# Patient Record
Sex: MALE | Race: OTHER | HISPANIC OR LATINO | URBAN - METROPOLITAN AREA
[De-identification: names, ages, dates, MRNs, and addresses within clinical notes are randomized per-mention and may not be internally consistent; named-entity substitution may affect disease eponyms.]

---

## 2023-01-16 ENCOUNTER — INPATIENT (INPATIENT)
Facility: HOSPITAL | Age: 78
LOS: 0 days | Discharge: HOME | End: 2023-01-16
Attending: INTERNAL MEDICINE | Admitting: INTERNAL MEDICINE
Payer: MEDICARE

## 2023-01-16 VITALS
OXYGEN SATURATION: 109 % | RESPIRATION RATE: 16 BRPM | TEMPERATURE: 98 F | SYSTOLIC BLOOD PRESSURE: 143 MMHG | HEART RATE: 109 BPM | DIASTOLIC BLOOD PRESSURE: 94 MMHG

## 2023-01-16 VITALS
DIASTOLIC BLOOD PRESSURE: 69 MMHG | RESPIRATION RATE: 18 BRPM | SYSTOLIC BLOOD PRESSURE: 130 MMHG | TEMPERATURE: 98 F | OXYGEN SATURATION: 99 % | HEART RATE: 81 BPM

## 2023-01-16 DIAGNOSIS — Z29.9 ENCOUNTER FOR PROPHYLACTIC MEASURES, UNSPECIFIED: ICD-10-CM

## 2023-01-16 DIAGNOSIS — E11.65 TYPE 2 DIABETES MELLITUS WITH HYPERGLYCEMIA: ICD-10-CM

## 2023-01-16 DIAGNOSIS — I10 ESSENTIAL (PRIMARY) HYPERTENSION: ICD-10-CM

## 2023-01-16 DIAGNOSIS — E11.9 TYPE 2 DIABETES MELLITUS WITHOUT COMPLICATIONS: ICD-10-CM

## 2023-01-16 LAB
A1C WITH ESTIMATED AVERAGE GLUCOSE RESULT: 12.7 % — HIGH (ref 4–5.6)
A1C WITH ESTIMATED AVERAGE GLUCOSE RESULT: 13 % — HIGH (ref 4–5.6)
ALBUMIN SERPL ELPH-MCNC: 4.3 G/DL — SIGNIFICANT CHANGE UP (ref 3.3–5)
ALP SERPL-CCNC: 94 U/L — SIGNIFICANT CHANGE UP (ref 40–120)
ALT FLD-CCNC: 22 U/L — SIGNIFICANT CHANGE UP (ref 4–41)
AMPHET UR-MCNC: NEGATIVE — SIGNIFICANT CHANGE UP
ANION GAP SERPL CALC-SCNC: 10 MMOL/L — SIGNIFICANT CHANGE UP (ref 7–14)
APAP SERPL-MCNC: <10 UG/ML — LOW (ref 15–25)
APPEARANCE UR: CLEAR — SIGNIFICANT CHANGE UP
APTT BLD: 24.7 SEC — LOW (ref 27–36.3)
AST SERPL-CCNC: 20 U/L — SIGNIFICANT CHANGE UP (ref 4–40)
B PERT DNA SPEC QL NAA+PROBE: SIGNIFICANT CHANGE UP
B PERT+PARAPERT DNA PNL SPEC NAA+PROBE: SIGNIFICANT CHANGE UP
B-OH-BUTYR SERPL-SCNC: 0.8 MMOL/L — HIGH (ref 0–0.4)
BARBITURATES UR SCN-MCNC: NEGATIVE — SIGNIFICANT CHANGE UP
BASE EXCESS BLDV CALC-SCNC: 2.3 MMOL/L — SIGNIFICANT CHANGE UP (ref -2–3)
BASOPHILS # BLD AUTO: 0.05 K/UL — SIGNIFICANT CHANGE UP (ref 0–0.2)
BASOPHILS NFR BLD AUTO: 0.4 % — SIGNIFICANT CHANGE UP (ref 0–2)
BENZODIAZ UR-MCNC: NEGATIVE — SIGNIFICANT CHANGE UP
BILIRUB SERPL-MCNC: 0.7 MG/DL — SIGNIFICANT CHANGE UP (ref 0.2–1.2)
BILIRUB UR-MCNC: NEGATIVE — SIGNIFICANT CHANGE UP
BLOOD GAS VENOUS COMPREHENSIVE RESULT: SIGNIFICANT CHANGE UP
BORDETELLA PARAPERTUSSIS (RAPRVP): SIGNIFICANT CHANGE UP
BUN SERPL-MCNC: 24 MG/DL — HIGH (ref 7–23)
C PNEUM DNA SPEC QL NAA+PROBE: SIGNIFICANT CHANGE UP
CALCIUM SERPL-MCNC: 9.2 MG/DL — SIGNIFICANT CHANGE UP (ref 8.4–10.5)
CHLORIDE BLDV-SCNC: 95 MMOL/L — LOW (ref 96–108)
CHLORIDE SERPL-SCNC: 93 MMOL/L — LOW (ref 98–107)
CK SERPL-CCNC: 215 U/L — HIGH (ref 30–200)
CO2 BLDV-SCNC: 30.4 MMOL/L — HIGH (ref 22–26)
CO2 SERPL-SCNC: 28 MMOL/L — SIGNIFICANT CHANGE UP (ref 22–31)
COCAINE METAB.OTHER UR-MCNC: NEGATIVE — SIGNIFICANT CHANGE UP
COLOR SPEC: SIGNIFICANT CHANGE UP
CREAT SERPL-MCNC: 0.65 MG/DL — SIGNIFICANT CHANGE UP (ref 0.5–1.3)
CREATININE URINE RESULT, DAU: 63 MG/DL — SIGNIFICANT CHANGE UP
DIFF PNL FLD: NEGATIVE — SIGNIFICANT CHANGE UP
EGFR: 98 ML/MIN/1.73M2 — SIGNIFICANT CHANGE UP
EOSINOPHIL # BLD AUTO: 0 K/UL — SIGNIFICANT CHANGE UP (ref 0–0.5)
EOSINOPHIL NFR BLD AUTO: 0 % — SIGNIFICANT CHANGE UP (ref 0–6)
ESTIMATED AVERAGE GLUCOSE: 318 — SIGNIFICANT CHANGE UP
ESTIMATED AVERAGE GLUCOSE: 326 — SIGNIFICANT CHANGE UP
ETHANOL SERPL-MCNC: <10 MG/DL — SIGNIFICANT CHANGE UP
FLUAV SUBTYP SPEC NAA+PROBE: SIGNIFICANT CHANGE UP
FLUBV RNA SPEC QL NAA+PROBE: SIGNIFICANT CHANGE UP
GAS PNL BLDV: 129 MMOL/L — LOW (ref 136–145)
GLUCOSE BLDC GLUCOMTR-MCNC: 248 MG/DL — HIGH (ref 70–99)
GLUCOSE BLDC GLUCOMTR-MCNC: 250 MG/DL — HIGH (ref 70–99)
GLUCOSE BLDC GLUCOMTR-MCNC: 327 MG/DL — HIGH (ref 70–99)
GLUCOSE BLDV-MCNC: 445 MG/DL — HIGH (ref 70–99)
GLUCOSE SERPL-MCNC: 438 MG/DL — HIGH (ref 70–99)
GLUCOSE UR QL: ABNORMAL
HADV DNA SPEC QL NAA+PROBE: SIGNIFICANT CHANGE UP
HCO3 BLDV-SCNC: 29 MMOL/L — SIGNIFICANT CHANGE UP (ref 22–29)
HCOV 229E RNA SPEC QL NAA+PROBE: SIGNIFICANT CHANGE UP
HCOV HKU1 RNA SPEC QL NAA+PROBE: SIGNIFICANT CHANGE UP
HCOV NL63 RNA SPEC QL NAA+PROBE: SIGNIFICANT CHANGE UP
HCOV OC43 RNA SPEC QL NAA+PROBE: SIGNIFICANT CHANGE UP
HCT VFR BLD CALC: 40.6 % — SIGNIFICANT CHANGE UP (ref 39–50)
HCT VFR BLDA CALC: 43 % — SIGNIFICANT CHANGE UP (ref 39–51)
HGB BLD CALC-MCNC: 14.3 G/DL — SIGNIFICANT CHANGE UP (ref 13–17)
HGB BLD-MCNC: 14.2 G/DL — SIGNIFICANT CHANGE UP (ref 13–17)
HMPV RNA SPEC QL NAA+PROBE: SIGNIFICANT CHANGE UP
HPIV1 RNA SPEC QL NAA+PROBE: SIGNIFICANT CHANGE UP
HPIV2 RNA SPEC QL NAA+PROBE: SIGNIFICANT CHANGE UP
HPIV3 RNA SPEC QL NAA+PROBE: SIGNIFICANT CHANGE UP
HPIV4 RNA SPEC QL NAA+PROBE: SIGNIFICANT CHANGE UP
IANC: 9.71 K/UL — HIGH (ref 1.8–7.4)
IMM GRANULOCYTES NFR BLD AUTO: 0.4 % — SIGNIFICANT CHANGE UP (ref 0–0.9)
INR BLD: 0.99 RATIO — SIGNIFICANT CHANGE UP (ref 0.88–1.16)
KETONES UR-MCNC: ABNORMAL
LACTATE BLDV-MCNC: 1.7 MMOL/L — SIGNIFICANT CHANGE UP (ref 0.5–2)
LEUKOCYTE ESTERASE UR-ACNC: NEGATIVE — SIGNIFICANT CHANGE UP
LIDOCAIN IGE QN: 34 U/L — SIGNIFICANT CHANGE UP (ref 7–60)
LYMPHOCYTES # BLD AUTO: 23.3 % — SIGNIFICANT CHANGE UP (ref 13–44)
LYMPHOCYTES # BLD AUTO: 3.22 K/UL — SIGNIFICANT CHANGE UP (ref 1–3.3)
M PNEUMO DNA SPEC QL NAA+PROBE: SIGNIFICANT CHANGE UP
MAGNESIUM SERPL-MCNC: 1.8 MG/DL — SIGNIFICANT CHANGE UP (ref 1.6–2.6)
MCHC RBC-ENTMCNC: 29.2 PG — SIGNIFICANT CHANGE UP (ref 27–34)
MCHC RBC-ENTMCNC: 35 GM/DL — SIGNIFICANT CHANGE UP (ref 32–36)
MCV RBC AUTO: 83.4 FL — SIGNIFICANT CHANGE UP (ref 80–100)
METHADONE UR-MCNC: NEGATIVE — SIGNIFICANT CHANGE UP
MONOCYTES # BLD AUTO: 0.77 K/UL — SIGNIFICANT CHANGE UP (ref 0–0.9)
MONOCYTES NFR BLD AUTO: 5.6 % — SIGNIFICANT CHANGE UP (ref 2–14)
NEUTROPHILS # BLD AUTO: 9.71 K/UL — HIGH (ref 1.8–7.4)
NEUTROPHILS NFR BLD AUTO: 70.3 % — SIGNIFICANT CHANGE UP (ref 43–77)
NITRITE UR-MCNC: NEGATIVE — SIGNIFICANT CHANGE UP
NRBC # BLD: 0 /100 WBCS — SIGNIFICANT CHANGE UP (ref 0–0)
NRBC # FLD: 0 K/UL — SIGNIFICANT CHANGE UP (ref 0–0)
NT-PROBNP SERPL-SCNC: 103 PG/ML — SIGNIFICANT CHANGE UP
OPIATES UR-MCNC: NEGATIVE — SIGNIFICANT CHANGE UP
OXYCODONE UR-MCNC: NEGATIVE — SIGNIFICANT CHANGE UP
PCO2 BLDV: 51 MMHG — SIGNIFICANT CHANGE UP (ref 42–55)
PCP SPEC-MCNC: SIGNIFICANT CHANGE UP
PCP UR-MCNC: NEGATIVE — SIGNIFICANT CHANGE UP
PH BLDV: 7.36 — SIGNIFICANT CHANGE UP (ref 7.32–7.43)
PH UR: 6 — SIGNIFICANT CHANGE UP (ref 5–8)
PLATELET # BLD AUTO: 186 K/UL — SIGNIFICANT CHANGE UP (ref 150–400)
PO2 BLDV: 40 MMHG — SIGNIFICANT CHANGE UP
POTASSIUM BLDV-SCNC: 4.7 MMOL/L — SIGNIFICANT CHANGE UP (ref 3.5–5.1)
POTASSIUM SERPL-MCNC: 4.5 MMOL/L — SIGNIFICANT CHANGE UP (ref 3.5–5.3)
POTASSIUM SERPL-SCNC: 4.5 MMOL/L — SIGNIFICANT CHANGE UP (ref 3.5–5.3)
PROCALCITONIN SERPL-MCNC: 0.17 NG/ML — HIGH (ref 0.02–0.1)
PROT SERPL-MCNC: 6.7 G/DL — SIGNIFICANT CHANGE UP (ref 6–8.3)
PROT UR-MCNC: ABNORMAL
PROTHROM AB SERPL-ACNC: 11.5 SEC — SIGNIFICANT CHANGE UP (ref 10.5–13.4)
RAPID RVP RESULT: SIGNIFICANT CHANGE UP
RBC # BLD: 4.87 M/UL — SIGNIFICANT CHANGE UP (ref 4.2–5.8)
RBC # FLD: 12.3 % — SIGNIFICANT CHANGE UP (ref 10.3–14.5)
RSV RNA SPEC QL NAA+PROBE: SIGNIFICANT CHANGE UP
RV+EV RNA SPEC QL NAA+PROBE: SIGNIFICANT CHANGE UP
SALICYLATES SERPL-MCNC: <0.3 MG/DL — LOW (ref 15–30)
SAO2 % BLDV: 66.1 % — SIGNIFICANT CHANGE UP
SARS-COV-2 RNA SPEC QL NAA+PROBE: SIGNIFICANT CHANGE UP
SODIUM SERPL-SCNC: 131 MMOL/L — LOW (ref 135–145)
SP GR SPEC: 1.03 — SIGNIFICANT CHANGE UP (ref 1.01–1.05)
THC UR QL: NEGATIVE — SIGNIFICANT CHANGE UP
TOXICOLOGY SCREEN, DRUGS OF ABUSE, SERUM RESULT: SIGNIFICANT CHANGE UP
TROPONIN T, HIGH SENSITIVITY RESULT: 14 NG/L — SIGNIFICANT CHANGE UP
TSH SERPL-MCNC: 1.07 UIU/ML — SIGNIFICANT CHANGE UP (ref 0.27–4.2)
UROBILINOGEN FLD QL: SIGNIFICANT CHANGE UP
WBC # BLD: 13.81 K/UL — HIGH (ref 3.8–10.5)
WBC # FLD AUTO: 13.81 K/UL — HIGH (ref 3.8–10.5)

## 2023-01-16 PROCEDURE — 72170 X-RAY EXAM OF PELVIS: CPT | Mod: 26

## 2023-01-16 PROCEDURE — 71045 X-RAY EXAM CHEST 1 VIEW: CPT | Mod: 26

## 2023-01-16 PROCEDURE — 72125 CT NECK SPINE W/O DYE: CPT | Mod: 26,QQ

## 2023-01-16 PROCEDURE — 99223 1ST HOSP IP/OBS HIGH 75: CPT | Mod: GC

## 2023-01-16 PROCEDURE — 99285 EMERGENCY DEPT VISIT HI MDM: CPT

## 2023-01-16 PROCEDURE — 70450 CT HEAD/BRAIN W/O DYE: CPT | Mod: 26,QQ

## 2023-01-16 RX ORDER — TETANUS TOXOID, REDUCED DIPHTHERIA TOXOID AND ACELLULAR PERTUSSIS VACCINE, ADSORBED 5; 2.5; 8; 8; 2.5 [IU]/.5ML; [IU]/.5ML; UG/.5ML; UG/.5ML; UG/.5ML
0.5 SUSPENSION INTRAMUSCULAR ONCE
Refills: 0 | Status: COMPLETED | OUTPATIENT
Start: 2023-01-16 | End: 2023-01-16

## 2023-01-16 RX ORDER — SODIUM CHLORIDE 9 MG/ML
1000 INJECTION, SOLUTION INTRAVENOUS
Refills: 0 | Status: DISCONTINUED | OUTPATIENT
Start: 2023-01-16 | End: 2023-01-16

## 2023-01-16 RX ORDER — DEXTROSE 50 % IN WATER 50 %
25 SYRINGE (ML) INTRAVENOUS ONCE
Refills: 0 | Status: DISCONTINUED | OUTPATIENT
Start: 2023-01-16 | End: 2023-01-16

## 2023-01-16 RX ORDER — SODIUM CHLORIDE 9 MG/ML
1000 INJECTION INTRAMUSCULAR; INTRAVENOUS; SUBCUTANEOUS ONCE
Refills: 0 | Status: COMPLETED | OUTPATIENT
Start: 2023-01-16 | End: 2023-01-16

## 2023-01-16 RX ORDER — LANOLIN ALCOHOL/MO/W.PET/CERES
3 CREAM (GRAM) TOPICAL AT BEDTIME
Refills: 0 | Status: DISCONTINUED | OUTPATIENT
Start: 2023-01-16 | End: 2023-01-16

## 2023-01-16 RX ORDER — ONDANSETRON 8 MG/1
4 TABLET, FILM COATED ORAL EVERY 8 HOURS
Refills: 0 | Status: DISCONTINUED | OUTPATIENT
Start: 2023-01-16 | End: 2023-01-16

## 2023-01-16 RX ORDER — GLUCAGON INJECTION, SOLUTION 0.5 MG/.1ML
1 INJECTION, SOLUTION SUBCUTANEOUS ONCE
Refills: 0 | Status: DISCONTINUED | OUTPATIENT
Start: 2023-01-16 | End: 2023-01-16

## 2023-01-16 RX ORDER — DEXTROSE 50 % IN WATER 50 %
15 SYRINGE (ML) INTRAVENOUS ONCE
Refills: 0 | Status: DISCONTINUED | OUTPATIENT
Start: 2023-01-16 | End: 2023-01-16

## 2023-01-16 RX ORDER — INSULIN LISPRO 100/ML
VIAL (ML) SUBCUTANEOUS AT BEDTIME
Refills: 0 | Status: DISCONTINUED | OUTPATIENT
Start: 2023-01-16 | End: 2023-01-16

## 2023-01-16 RX ORDER — DEXTROSE 50 % IN WATER 50 %
12.5 SYRINGE (ML) INTRAVENOUS ONCE
Refills: 0 | Status: DISCONTINUED | OUTPATIENT
Start: 2023-01-16 | End: 2023-01-16

## 2023-01-16 RX ORDER — INFLUENZA VIRUS VACCINE 15; 15; 15; 15 UG/.5ML; UG/.5ML; UG/.5ML; UG/.5ML
0.7 SUSPENSION INTRAMUSCULAR ONCE
Refills: 0 | Status: DISCONTINUED | OUTPATIENT
Start: 2023-01-16 | End: 2023-01-16

## 2023-01-16 RX ORDER — ACETAMINOPHEN 500 MG
650 TABLET ORAL EVERY 6 HOURS
Refills: 0 | Status: DISCONTINUED | OUTPATIENT
Start: 2023-01-16 | End: 2023-01-16

## 2023-01-16 RX ORDER — INSULIN LISPRO 100/ML
VIAL (ML) SUBCUTANEOUS
Refills: 0 | Status: DISCONTINUED | OUTPATIENT
Start: 2023-01-16 | End: 2023-01-16

## 2023-01-16 RX ORDER — ACETAMINOPHEN 500 MG
975 TABLET ORAL ONCE
Refills: 0 | Status: COMPLETED | OUTPATIENT
Start: 2023-01-16 | End: 2023-01-16

## 2023-01-16 RX ADMIN — Medication 975 MILLIGRAM(S): at 07:31

## 2023-01-16 RX ADMIN — Medication 975 MILLIGRAM(S): at 08:01

## 2023-01-16 RX ADMIN — SODIUM CHLORIDE 1000 MILLILITER(S): 9 INJECTION INTRAMUSCULAR; INTRAVENOUS; SUBCUTANEOUS at 07:30

## 2023-01-16 RX ADMIN — TETANUS TOXOID, REDUCED DIPHTHERIA TOXOID AND ACELLULAR PERTUSSIS VACCINE, ADSORBED 0.5 MILLILITER(S): 5; 2.5; 8; 8; 2.5 SUSPENSION INTRAMUSCULAR at 07:31

## 2023-01-16 RX ADMIN — SODIUM CHLORIDE 1000 MILLILITER(S): 9 INJECTION INTRAMUSCULAR; INTRAVENOUS; SUBCUTANEOUS at 07:33

## 2023-01-16 RX ADMIN — Medication 4: at 16:07

## 2023-01-16 NOTE — ED ADULT NURSE REASSESSMENT NOTE - NS ED NURSE REASSESS COMMENT FT1
Report received from KAREN Hawkins. Patient is AA&Ox2, calm, cooperative, in no acute distress. Fluids still running though IV, then patient needs finger stick blood glucose check.

## 2023-01-16 NOTE — DISCHARGE NOTE PROVIDER - HOSPITAL COURSE
77M with PMH HTN and DM2 who presented to the ED with high blood glucose levels after being brought in by EMS after [place] the [place]. In the ED, patient was given 2L IVF, labs not concerning for DKA or HHS. BG 400s, which came down to 250 w/o intervention. Patient requesting to leave hospital upon arrival to the floors, despite discussion with patient using  services about risks and benefits of staying, patient decided that he would like to leave and will follow up outpatient w/ his PCP tomorrow. Patient states he has access to medications in his house and reported that he knows how to administer insulin. He acknowledges that medical team felt that it would be safer for him to stay but pt felt that he could better manage his diabetes outpatient. Patient states he takes insulin and "a pill" for diabetes but upon request cannot provide his list of medications.

## 2023-01-16 NOTE — DISCHARGE NOTE PROVIDER - CARE PROVIDER_API CALL
LUKE CARRILLO  Internal Medicine  [id number]  [place] [address]  Phone: [phone]  Fax: [phone]  Established Patient  Follow Up Time: 1 week

## 2023-01-16 NOTE — ED PROVIDER NOTE - PHYSICAL EXAMINATION
Vitals: I have reviewed the patients vital signs  General: nontoxic appearing  HEENT: Atraumatic, normocephalic, airway patent  Eyes: EOMI, tracking appropriately  Neck: no tracheal deviation  Chest/Lungs: no trauma, symmetric chest rise, speaking in complete sentences,  no resp distress  Heart: skin and extremities well perfused, regular rate and rhythm  Neuro: A+Ox3, appears non focal  MSK: strength at baseline in all extremities, no muscle wasting or atrophy  Skin: no cyanosis, no jaundice GEN - NAD, A&Ox3  HEAD - NC/AT  EYES - PERRL, EOMI  ENT - Airway patent, mucous membranes dry  PULMONARY - CTA b/l, symmetric breath sounds, no W/R/R  CARDIAC - +S1S2, RRR, no M/G/R, no JVD  ABDOMEN - ND, NT, soft, no guarding, no rebound, no rigidity; ventral hernia visualized   - No CVA TTP, no suprapubic TTP  BACK - No midline tenderness  EXTREMITIES - FROM, symmetric pulses, capillary refill <2 seconds, no edema, 5/5 strength in b/l UE and LE  SKIN - Various abrasions in different stages of healing on trunk and b/l UE and LE. Pt has dirt all over his body.  NEUROLOGIC - Alert, speech clear, no focal deficits, CN II-XII grossly intact, strength and sensation grossly intact  PSYCH - Normal mood/affect, normal insight GEN - NAD, A&Ox3  HEAD - NC/AT  EYES - PERRL, EOMI  ENT - Airway patent, mucous membranes dry  PULMONARY - CTA b/l, symmetric breath sounds, no W/R/R  CARDIAC - +S1S2, RRR, no M/G/R, no JVD  ABDOMEN - ND, NT, soft, no guarding, no rebound, no rigidity; ventral hernia visualized   - No CVA TTP, no suprapubic TTP  BACK - No midline tenderness  EXTREMITIES - FROM, symmetric pulses, capillary refill <2 seconds, no edema, 5/5 strength in b/l UE and LE  SKIN - Various abrasions in different stages of healing on trunk and b/l UE and LE. Pt has dirt all over his body.  NEUROLOGIC - Alert, speech clear, CN II-XII grossly intact, strength grossly intact, sensation decreased in b/l feet  PSYCH - Normal mood/affect, normal insight

## 2023-01-16 NOTE — ED PROVIDER NOTE - CLINICAL SUMMARY MEDICAL DECISION MAKING FREE TEXT BOX
77-year-old male with a past medical history of hypertension diabetes found [place] on highway.  The patient is alert and oriented x4 and review of his reported address matches with his personal records that he has in his wallet.  He is reporting weakness and fall so we will do broad work-up including electrolytes urinalysis and CT scan ahead.  He does also report signs that are concerning for uncontrolled diabetes.  With all this and the patient's general disorganized state I think he will be admitted.  Please follow the progress notes 77-year-old male with a past medical history of hypertension diabetes found wandering on highway.  The patient is alert and oriented x4 and review of his reported address matches with his personal records that he has in his wallet.  He is reporting weakness and fall so we will do broad work-up. Will evaluate for DKA, electrolyte imbalance, anemia, intracranial bleed, UTI, hypothyroidism, cardiac etiology, toxic etiology. ekg, cxr, xray pelvis, ct head, ct c-spine, labs, urinalysis, tetanus, pain control, IVF. He does also report signs that are concerning for uncontrolled diabetes.  With all this and the patient's general disorganized state I think he will be admitted.  Please follow the progress notes.

## 2023-01-16 NOTE — DISCHARGE NOTE PROVIDER - ATTENDING DISCHARGE PHYSICAL EXAMINATION:
77M with HTN and DM who was brought in by EMS after he was found walking [address] [facility] [address] and a bystander called the Police. In the ED he was found to have BG in the 400s. He received 2L IVF and FS resolved to the 240s. Labs reassuring and did not show evidence of DKA or HHS. He was admitted due to concern for poor follow-up of diabetes. RN called primary team to bedside because shortly after admission to the floor patient was asking to be discharged.    I had a long conversation with the patient using the telephone . He expressed that he did not want to come to the hospital and would like to be discharged home. On evaluation he is A&Ox4 and can explain why he was brought to the hospital. He was able to express understanding of the medical team's perspective that he was admitted for optimization of his diabetes, but he feels that this hyperglycemia is a chronic problem and he would rather try to manage it with his primary care doctor. He reports that he can go to his PCP 's office tomorrow, that he has all of his medications at his apartment, and can take the bus home from the hospital. Given all of this decision was made to discharge patient home on current outpatient diabetes regimen (which we were unable to confirm prior to his discharge) and our urging that he visit his PCP tomorrow for further guidance.

## 2023-01-16 NOTE — ED PROVIDER NOTE - PRINCIPAL DIAGNOSIS
Paged on-call hospitalist to notify about IV infiltration during vanco administration (following policy) and update on blood culture result   Uncontrolled diabetes mellitus with hyperglycemia

## 2023-01-16 NOTE — ED ADULT NURSE NOTE - CHIEF COMPLAINT QUOTE
Patient was found walking on the [facility] [address] [place]. 911 called by a bystander. French speaking,  # [id number] -  Patient states he was walking to [place] to see a friend. Patient A&Ox2 to name and place. Denies etoh/drug use. Patient appears to be a poor historian.

## 2023-01-16 NOTE — ED ADULT NURSE NOTE - OBJECTIVE STATEMENT
Pt is A&O x 2, maintained on bedrest, shows no signs of acute distress. Brought into the ED after being found walking on the highway. Currently denies ETOH/drug use. Pt appears to be a poor historian. Currently denies SOB or chest discomfort. Respirations even and unlabored. Pt noted to be hyperglycemic upon arrival to ED. Pt is A&O x 2, maintained on bedrest, shows no signs of acute distress. Brought into the ED after being found walking on the highway. Currently denies ETOH/drug use. Pt appears to be a poor historian. Currently denies SOB or chest discomfort. Respirations even and unlabored. Pt noted to be hyperglycemic upon arrival to ED. Right AC 20 gauge placed and labs drawn. Medications administered. Pending [facility].

## 2023-01-16 NOTE — PATIENT PROFILE ADULT - FALL HARM RISK - HARM RISK INTERVENTIONS
Communicate Risk of Fall with Harm to all staff/Monitor for mental status changes/Monitor gait and stability/Reinforce activity limits and safety measures with patient and family/Sit up slowly, dangle for a short time, stand at bedside before walking/Tailored Fall Risk Interventions/Use of alarms - bed, chair and/or voice tab/Visual Cue: Yellow wristband and red socks/Bed in lowest position, wheels locked, appropriate side rails in place/Call bell, personal items and telephone in reach/Instruct patient to call for assistance before getting out of bed or chair/Non-slip footwear when patient is out of bed/[place] to call system/Physically safe environment - no spills, clutter or unnecessary equipment/Purposeful Proactive Rounding/Room/bathroom lighting operational, light cord in reach

## 2023-01-16 NOTE — ED ADULT TRIAGE NOTE - CHIEF COMPLAINT QUOTE
Patient was found walking on the [facility] [address]. 911 called by a bystander. English speaking,  # [id number] -  Patient states he was walking to [place]. Patient A&Ox2 to name and place. Denies etoh/drug use. Patient appears confused/poor historian, unable to explain why he was walking on the highway.  Patient was found walking on the [facility] [address]. 911 called by a bystander. Chinese speaking,  # [id number] -  Patient states he was walking to [place] to see a friend. Patient A&Ox2 to name and place. Denies etoh/drug use. Patient appears to be a poor historian.   Patient was found walking on the [facility] [address] carrying milk. 911 called by a bystander. Greek speaking,  # [phone] -  Patient states he was walking to [place] to see a friend. Patient A&Ox2 to name and place. Denies etoh/drug use. Patient appears to be a poor historian.

## 2023-01-16 NOTE — H&P ADULT - PROBLEM SELECTOR PLAN 1
BG 400s, now improved to 200s  pt reports taking insulin at home but does not know dose  given 2L IVF in ED  - moderate SSI BG 400s, now improved to 200s without intervention  pt reports taking insulin at home but does not know dose  given 2L IVF in ED  - moderate SSI BG 400s, now improved to 200s without intervention  pt reports taking insulin at home but does not know dose  given 2L IVF in ED  - moderate SSI, monitor FSBG  - daily BMP

## 2023-01-16 NOTE — H&P ADULT - PROBLEM SELECTOR PLAN 2
- pt reports taking anti-hypertensives at home but is unable to provide name  - BP within range during admission

## 2023-01-16 NOTE — ED PROVIDER NOTE - OBJECTIVE STATEMENT
77-year-old male with past medical history of hypertension and diabetes presenting to the emergency department today after [place] [facility] [address].  The patient states that he was walking back from his friend's house with a jug of milk back to his apartment which is [place].  He states that he has had a number of falls in the last 24 hours and feels weaker than usual.  States he is compliant with his diabetes medications.  Denies any headache, shortness of breath, chest pain, fevers, urinary complaints.  Patient is stating that he got off the bus and started walking down a empty street when the ambulance came to pick him up. 77-year-old male with past medical history of hypertension and diabetes presenting to the emergency department today after being found walking on the [facility] [address].  The patient states that he was walking back from his friend's house with a jug of milk back to his apartment which is [place].  He states that he has had a number of falls in the last 24 hours and feels weaker than usual.  Denies hitting head or LOC. States he is compliant with his diabetes medications (says he takes insulin and another "pill", but doesn't remember the name). Endorsing generalized body aches and b/l knee pain. Also endorsing increased thirst, polydipsia, and multiple episodes of n/v today. Denies any headache, shortness of breath, chest pain, fevers, back pain.  Patient is stating that he got off the bus and started walking down a empty street when the ambulance came to pick him up. Says he wouldn't have come to hospital if he wasn't picked up by police. NKDA.

## 2023-01-16 NOTE — H&P ADULT - NSHPREVIEWOFSYSTEMS_GEN_ALL_CORE
GENERAL: No fever or chills  EYES: No change in vision  HEENT: No trouble swallowing or speaking  CARDIAC: No chest pain  PULMONARY: No cough or SOB  GI: No nausea/vomiting  : No polydipsia  SKIN: No rashes  NEURO: No headache, no numbness  MSK: + R knee pain

## 2023-01-16 NOTE — DISCHARGE NOTE NURSING/CASE MANAGEMENT/SOCIAL WORK - NSDCPNINST_GEN_ALL_CORE
Notify MD if experiencing weakness, light handedness, shaking. Followup with primary doctor concerning your diabetes treatment immediately.

## 2023-01-16 NOTE — H&P ADULT - ATTENDING COMMENTS
77M with HTN and DM who was brought in by EMS after he was found walking [address] [facility] [address] and a bystander called the Police. In the ED he was found to have BG in the 400s. He received 2L IVF and FS resolved to the 240s. Labs reassuring and did not show evidence of DKA or HHS. He was admitted due to concern for poor follow-up of diabetes. RN called primary team to bedside because shortly after admission to the floor patient was asking to be discharged.    I had a long conversation with the patient using the telephone . He expressed that he did not want to come to the hospital and would like to be discharged home. On evaluation he is A&Ox4 and can explain why he was brought to the hospital. He was able to express understanding of the medical team's perspective that he was admitted for optimization of his diabetes, but he feels yesy this hyperglycemia is a chronic problem and he would rather try to manage it with his primary care doctor. He reports that he can go to his PCP 's office tomorrow, that he has all of his medications at his apartment, and can take the bus home from the hospital. Given all of this decision was made to discharge patient home on current outpatient diabetes regimen (which we were unable to confirm prior to his discharge) and our urging that he visit his PCP tomorrow for further guidance. 77M with HTN and DM who was brought in by EMS after he was found walking on the [facility] [address] and a bystander called the Police. In the ED he was found to have BG in the 400s. He received 2L IVF and FS resolved to the 240s. Labs reassuring and did not show evidence of DKA or HHS. He was admitted due to concern for poor follow-up of diabetes. RN called primary team to bedside because shortly after admission to the floor patient was asking to be discharged.    I had a long conversation with the patient using the telephone . He expressed that he did not want to come to the hospital and would like to be discharged home. On evaluation he is A&Ox4 and can explain why he was brought to the hospital. He was able to express understanding of the medical team's perspective that he was admitted for optimization of his diabetes, but he feels yesy this hyperglycemia is a chronic problem and he would rather try to manage it with his primary care doctor. He reports that he can go to his PCP 's office tomorrow, that he has all of his medications at his apartment, and can take the bus home from the hospital. Given all of this decision was made to discharge patient home on current outpatient diabetes regimen (which we were unable to confirm prior to his discharge) and our urging that he visit his PCP tomorrow for further guidance.      Admitted for acute hyperglycemia  Labs reviewed by me as above  CXR reviewed by me with clear lungs  Discussed plan with bedside RN  EKG reviewed by me with

## 2023-01-16 NOTE — ED PROVIDER NOTE - PROGRESS NOTE DETAILS
Gurwinder Bran- pt stable. pt received 2L NS, glucose still 387 from 454. No evidence of dka on labs. Has no pcp, very poor follow up. Will admit. Pt updated.

## 2023-01-16 NOTE — ED PROVIDER NOTE - NS ED MD DISPO ISOLATION TYPES
AYESHA....Spoke with pt. She requested last week to change her Irbesartan due to \"post nasal drip\" possibly being caused by the ACE. She was started on Clonidine. That was changed over the weekend because of how it made her feel. She is asking to go back to  Irbesartan. Keeping her BP under control is more important to her. EMR updated   None

## 2023-01-16 NOTE — H&P ADULT - NSHPLABSRESULTS_GEN_ALL_CORE
14.2   13.81 )-----------( 186      ( 2023 07:38 )             40.6       01-16    131<L>  |  93<L>  |  24<H>  ----------------------------<  438<H>  4.5   |  28  |  0.65    Ca    9.2      2023 07:38  Mg     1.80         TPro  6.7  /  Alb  4.3  /  TBili  0.7  /  DBili  x   /  AST  20  /  ALT  22  /  AlkPhos  94  -              Urinalysis Basic - ( 2023 10:24 )    Color: Light Yellow / Appearance: Clear / S.034 / pH: x  Gluc: x / Ketone: Moderate  / Bili: Negative / Urobili: <2 mg/dL   Blood: x / Protein: Trace / Nitrite: Negative   Leuk Esterase: Negative / RBC: x / WBC x   Sq Epi: x / Non Sq Epi: x / Bacteria: x        PT/INR - ( 2023 07:38 )   PT: 11.5 sec;   INR: 0.99 ratio         PTT - ( 2023 07:38 )  PTT:24.7 sec    Lactate Trend      CARDIAC MARKERS ( 2023 07:38 )  x     / x     / 215 U/L / x     / x            CAPILLARY BLOOD GLUCOSE      POCT Blood Glucose.: 248 mg/dL (2023 16:06)

## 2023-01-16 NOTE — PROVIDER CONTACT NOTE (CHANGE IN STATUS NOTIFICATION) - ASSESSMENT
Pt A+OX4. Pt Fijian speaking and  was used to complete profile. Pt oriented to unit and call bell and verbalized understanding of instructions. Pt appears now to show no s/s of distress. Pt's blood glucose was obtained and treated. HELDER shook and MD Eugenie Tinsley notified.

## 2023-01-16 NOTE — H&P ADULT - HISTORY OF PRESENT ILLNESS
77-year-old male with past medical history of hypertension and diabetes presenting to the emergency department today after being found walking on the [facility] [address].  The patient states that he was walking back from his friend's house with a jug of milk back to his apartment which is [place].  He states that he has had a number of falls in the last 24 hours and feels weaker than usual.  Denies hitting head or LOC. States he is compliant with his diabetes medications (says he takes insulin and another "pill", but doesn't remember the name). Endorsing generalized body aches and b/l knee pain. Denies increased thirst, polydipsia, headache, shortness of breath, chest pain, fevers, back pain.  Patient is stating that he got off the bus and started walking down a empty street when the ambulance came to pick him up. Says he wouldn't have come to hospital if he wasn't picked up by police.     In the ED, labs notable for BG in 400s. Pt received 2L IVF.    PCP: Dr. Hernan Escamilla   77-year-old male with past medical history of hypertension and diabetes presenting to the emergency department today after being found walking on the [facility] [address].  The patient states that he was walking back from his friend's house with a jug of milk back to his apartment which is in [place].  He states that he has had a number of falls in the last 24 hours and feels weaker than usual.  Denies hitting head or LOC. States he is compliant with his diabetes medications (says he takes insulin and another "pill", but doesn't remember the name). Denies increased thirst, polydipsia, headache, shortness of breath, chest pain, fevers, back pain. Reports that his fingersticks have been elevated at home and he sees a PCP in [place] for management of diabetes. Patient is stating that he got off the bus and started walking down a empty street when the ambulance came to pick him up. Says he wouldn't have come to hospital if he wasn't picked up by police.     In the ED, labs notable for BG in 400s. Pt received 2L IVF. BG came down to 240s without other intervention.    PCP: Dr. Hernan Escamilla      Unable to confirm outpatient medications as patient is unsure of his medications.

## 2023-01-16 NOTE — H&P ADULT - ASSESSMENT
77-year-old male with a past medical history of hypertension and diabetes admitted for high blood glucose levels.

## 2023-01-16 NOTE — PROVIDER CONTACT NOTE (CHANGE IN STATUS NOTIFICATION) - ACTION/TREATMENT ORDERED:
MD arrived on unit to assess patient. MD then called attending physician who arrived and also assessed patient. Patient was educated on the need to stay for further glucose monitoring. Attending Physician agreed to discharge patient. MD Eugenie Nash completed discharge instructions and patient received discharge instructions. Pt declined to hear diabetes education. MD Traore aware.

## 2023-01-16 NOTE — DISCHARGE NOTE NURSING/CASE MANAGEMENT/SOCIAL WORK - PATIENT PORTAL LINK FT
You can access the FollowMyHealth Patient Portal offered by [facility] by registering at the following website: [email or web address]. By joining [facility]’s FollowMyHealth portal, you will also be able to view your health information using other applications (apps) compatible with our system.

## 2023-01-16 NOTE — DISCHARGE NOTE PROVIDER - NSDCCPCAREPLAN_GEN_ALL_CORE_FT
PRINCIPAL DISCHARGE DIAGNOSIS  Diagnosis: Uncontrolled diabetes mellitus with hyperglycemia  Assessment and Plan of Treatment: You were seen in the hospital for diabetes with a high blood glucose level. You were given fluids which helped improve your sugar. Please continue taking all of your medications as prescribed by your primary care doctor. Please follow up with your primary care provider within 1 week. If you develop fever, chills, shortness of breath, or worsening pain, please go to the nearest emergency department.

## 2023-01-16 NOTE — ED ADULT NURSE NOTE - NSIMPLEMENTINTERV_GEN_ALL_ED
Implemented All Fall Risk Interventions:  [place] to call system. Call bell, personal items and telephone within reach. Instruct patient to call for assistance. Room bathroom lighting operational. Non-slip footwear when patient is off stretcher. Physically safe environment: no spills, clutter or unnecessary equipment. Stretcher in lowest position, wheels locked, appropriate side rails in place. Provide visual cue, wrist band, yellow gown, etc. Monitor gait and stability. Monitor for mental status changes and reorient to person, place, and time. Review medications for side effects contributing to fall risk. Reinforce activity limits and safety measures with patient and family.

## 2023-01-16 NOTE — ED PROVIDER NOTE - CADM POA CENTRAL LINE
Thank you for your visit with me in the Cardiovascular Clinic at the [facility]! I appreciate your time.     Cardiology provider you saw during your visit: Jayshree Nowak, ILANA APRN CNP.    1. Check your blood pressure later this evening. Drink fluids this evening to rehydrate.   2. No medication changes today.   3. Follow up with Cardiology in one year.     Questions and [phone].   First press #1 for the University and then press #3 for Medical Questions to reach the Cardiology triage nurse.     On Call Cardiologist for after hours or on weekends: [phone], press option #4 and ask to speak to the on-call Cardiologist.      No

## 2023-01-16 NOTE — H&P ADULT - NSHPPHYSICALEXAM_GEN_ALL_CORE
GEN - NAD, A&Ox3  HEAD - NC/AT  EYES - PERRL, EOMI  ENT - Airway patent, mucous membranes dry  PULMONARY CTA b/l, symmetric breath sounds, no W/R/R  CARDIAC +S1S2, RRR, no M/G/R, no JVD  ABDOMEN - ND, NT, soft, no guarding, no rebound, no rigidity   - No CVA TTP, no suprapubic TTP  BACK - No midline tenderness  EXTREMITIES - FROM, symmetric pulses, capillary refill <2 seconds, no edema, 5/5 strength in b/l UE and LE  SKIN - Various abrasions in different stages of healing on trunk and b/l UE and LE  NEUROLOGIC - Alert, sensation decreased in b/l feet

## 2023-01-16 NOTE — DISCHARGE NOTE NURSING/CASE MANAGEMENT/SOCIAL WORK - NSDCVIVACCINE_GEN_ALL_CORE_FT
Tdap; 16-Jan-2023 07:31; Ross Chapman (RN); [employer]; [id number] (Exp. Date: 08-Dec-2024); IntraMuscular; Deltoid Left.; 0.5 milliLiter(s); VIS (VIS Published: 16-Jan-2023, VIS Presented: 16-Jan-2023);

## 2023-01-16 NOTE — ED PROVIDER NOTE - NS ED ROS FT
GENERAL: No fever or chills  EYES: No change in vision  HEENT: No trouble swallowing or speaking  CARDIAC: No chest pain  PULMONARY: No cough or SOB  GI: +nausea/vomiting  : +polydipsia  SKIN: No rashes  NEURO: No headache, no numbness  MSK: +knee pain  Otherwise as HPI or negative.

## 2023-01-17 LAB
CULTURE RESULTS: SIGNIFICANT CHANGE UP
SPECIMEN SOURCE: SIGNIFICANT CHANGE UP

## 2023-08-08 NOTE — ED ADULT NURSE NOTE - NSICDXPASTMEDICALHX_GEN_ALL_CORE_FT
CARDIOVASCULAR OFFICE VISIT    Patient: Jhonathan Jefferson, #[id number] Date: 8/8/2023 TIME: 4:46 PM   YOB: 1964 Attending: Dolores Gaines MD   59 year old male Primary Attending: Dolores Gaines MD     REASON FOR VISIT:   Chief Complaint   Patient presents with   • New Patient     H/O abdominal aortic aneurysm repair,  Aortic valve sclerosis       Jhonathan Jefferson is a 59 year old male who has a past medical history of AAA s/p EVAR 07/01/2020, HTN, polysubstance abuse.     Today patient reports doing well. 2 recent ED visits for opioid overdose. States he has not used since last ED visit. He was previously taking suboxone, but when this was stopped he relapsed. Patient at this time denies history of orthopnea, paroxysmal nocturnal dyspnea, palpitations, lightheadedness/dizziness, presyncope/syncope, edema and any anginal type chest pain. No further complaints at this time.     REVIEW OF SYSTEMS     All other ROS otherwise negative except as detailed in HPI.     HISTORIES      I have reviewed the past medical history, family history, social history, medications and allergies listed in the medical record as obtained by my nursing staff and support staff and agree with their documentation.    Past Surgical History:   Procedure Laterality Date   • Back surgery      herniated disc   • Fracture surgery      on right arm       Current Outpatient Medications   Medication Sig Dispense Refill   • dilTIAZem (CARDIZEM LA) 420 MG 24 hr tablet Take 1 tablet by mouth daily. 90 tablet 0   • QUEtiapine (SEROquel) 25 MG tablet Take 1 tablet by mouth in the morning and 1 tablet in the evening. 30 tablet 1   • furosemide (LASIX) 40 MG tablet Take 1 tablet by mouth daily. 30 tablet 0   • ferrous sulfate 325 (65 FE) MG EC tablet Take 1 tablet by mouth daily (with breakfast). 30 tablet 1   • folic acid (FOLATE) 1 MG tablet Take 1 mg by mouth daily.     • famotidine (PEPCID) 20 MG tablet Take 1 tablet by mouth in the morning  and 1 tablet in the evening. 60 tablet 1   • spironolactone (ALDACTONE) 25 MG tablet Take 1 tablet by mouth 2 times daily. 60 tablet 1   • polyethylene glycol (MIRALAX) 17 g packet Take 17 g by mouth daily. Stir and dissolve powder in any 4 to 8 ounces of beverage, then drink. 30 each 0   • topiramate (TOPAMAX) 25 MG tablet Take 1 tablet (25 mg total) by mouth in the morning and 1 tablet (25 mg total) before bedtime. Do all this for 30 days. 60 tablet 0   • cefUROXime (CEFTIN) 250 MG tablet Take 1 tablet (250 mg total) by mouth in the morning and 1 tablet (250 mg total) before bedtime. Do all this for 28 days. 56 tablet 0   • buprenorphine-naLOXone (SUBOXONE FILM) 4-1 MG sublingual film Place 8 mg of buprenorphine (2 Film total) under the tongue in the morning and 8 mg of buprenorphine (2 Film total) before bedtime. Do all this for 12 days. Indications: Opioid Dependence. 48 each 0   • acetaminophen (TYLENOL) 500 MG tablet Take 500 mg by mouth every 6 hours as needed for Pain or Fever.     • naproxen sodium (ALEVE) 220 MG tablet Take 4,400 mg by mouth 2 times daily as needed (pain).     • FAM Unable to Find Medication once. Patient denies taking any prescription and OTC medications at this time       No current facility-administered medications for this visit.       PHYSICAL EXAM         7/28/2023     2:29 AM 7/28/2023     2:30 AM 7/28/2023     3:00 AM 7/28/2023     3:30 AM 8/8/2023     2:53 PM   [facility] Extended Vitals - Weight in Kg/Lb   BP  128/78 141/91 131/88 126/96   Pulse  77 78 78 89   Resp 13  14 15    Weight kg     87.544 kg   Weight lb     193 lb   Height     5' 10\"   Height cm     177.8 cm   BMI     27.69   Pulse Ox  93 % 93 % 93 % 100 %   Patient Position   Semi-Sotomayor's     BP Location   RUE - Right upper extremity       General: Good general appearance, no acute distress. Normal weight.  Skin:  Warm and dry  Head:  Normocephalic and atraumatic.   Neck:  Trachea is midline. No adenopathy. Normal thyroid  PAST MEDICAL HISTORY:  DM (diabetes mellitus)     H/O: HTN (hypertension)      without mass or tenderness. No carotid bruit.   Cardiovascular: regular rate, regular rhythm, S1 and S2, no murmur, no click, or no rub. No S3 or S4. Absent JVD (jugular venous distension). No lower extremity edema. Pulses 2+ symmetrically in radial, femoral and pedal pulses. No ulcers, gangrene or trophic changes.   Chest and Respiratory:  Normal respiratory effort. No wheezes, crackles or rhonchi bilaterally.  Extremities: No gross deformity in upper and lower extremities.    LABS     Recent labs and imaging reports reviewed in electronic medical record.    Lab Results   Component Value Date    SODIUM 135 07/27/2023    POTASSIUM 3.9 07/27/2023    BUN 15 07/27/2023    CREATININE 1.50 (H) 07/28/2023    WBC 4.0 (L) 07/27/2023    HCT 39.6 07/27/2023    HGB 13.3 07/27/2023    INR 1.1 07/01/2020    GLUCOSE 109 (H) 07/27/2023    BNP 21 10/06/2016    MG 2.3 07/27/2023     DIAGNOSTIC TESTING/IMAGING     CTA Chest Abdomen Pelvis 03/18/2023   IMPRESSION:   Intact thoracoabdominal aorta. EVAR for infrarenal AAA, with decreasing   aneurysm sac. Very mild focal dilation of the proximal descending   thoracic aorta. No periaortic inflammatory change or dissection.     Echo 05/11/2023  Normal left ventricular systolic function.  Normal left ventricular chamber size.  No aortic valve stenosis.  Aortic valve sclerosis.  Mild mitral valve regurgitation.  Trivial-mild tricuspid valve regurgitation.    ASSESSMENT AND PLAN     AAA: S/p EVAR 2020. Residual aortic aneurysm 3.6 cm with no evidence of endoleak per CTA 03/2023. Monitor and optimize BP control.     Hypertension: Controlled, /96. Continue current medications.     Polysubstance abuse     Recommendations:  -Last CTA in 03/2023 showing stable AAA repair with no endoleak.   -Strongly encouraged patient f/u with PCP to restart suboxone.   -NM stress test to r/o ischemia.   -Continue current medications.     Return in about 3 weeks (around 8/29/2023). Or sooner if symptoms  persist/worsen. Instructions provided as documented in the after visit summary.    On 8/8/2023, IEmily scribed the services personally performed by Carlos Epps MD The documentation recorded by the scribe accurately and completely reflects the service(s) I personally performed and the decisions made by Carlos Epps MD.